# Patient Record
Sex: FEMALE | Race: ASIAN
[De-identification: names, ages, dates, MRNs, and addresses within clinical notes are randomized per-mention and may not be internally consistent; named-entity substitution may affect disease eponyms.]

---

## 2017-01-08 ENCOUNTER — HOSPITAL ENCOUNTER (EMERGENCY)
Dept: HOSPITAL 10 - FTE | Age: 62
Discharge: HOME | End: 2017-01-08
Payer: COMMERCIAL

## 2017-01-08 VITALS
BODY MASS INDEX: 19.77 KG/M2 | WEIGHT: 104.72 LBS | HEIGHT: 61 IN | BODY MASS INDEX: 19.77 KG/M2 | WEIGHT: 104.72 LBS | HEIGHT: 61 IN

## 2017-01-08 VITALS — TEMPERATURE: 99.1 F

## 2017-01-08 DIAGNOSIS — R05: Primary | ICD-10-CM

## 2017-01-08 DIAGNOSIS — R50.9: ICD-10-CM

## 2017-01-08 DIAGNOSIS — R09.81: ICD-10-CM

## 2017-01-08 DIAGNOSIS — J45.909: ICD-10-CM

## 2017-01-08 DIAGNOSIS — R19.7: ICD-10-CM

## 2017-01-08 DIAGNOSIS — R11.10: ICD-10-CM

## 2017-01-08 PROCEDURE — 99284 EMERGENCY DEPT VISIT MOD MDM: CPT

## 2017-01-08 NOTE — ERD
ER Documentation


Chief Complaint


Date/Time


DATE: 1/8/17 


TIME: 21:19


Chief Complaint


flu,sweatingx 2 days, reacting to Nyquil by diarrhea





HPI


61-year-old female presents here in the emergency department for cough runny 

nose, nasal congestion, diarrhea and vomiting for 2 days. Patient has been 

having the symptoms for the last 2 days. Patient has been having fever. Patient 

did not take any medications up and symptoms. Patient does not have any blood 

in the stool or black stool. Patient denies any blood in the vomit. Patient 

does not complain of abdominal pain. Patient does not have any flank pain. 

Patient does not have any chest pain or palpitations. Patient denies hematuria 

or dysuria.





ROS


All systems reviewed and are negative except as per history of present illness.





Medications


Home Meds


Active Scripts


Ondansetron (Ondansetron Odt) 4 Mg Tab.rapdis, 4 MG PO Q8 Y for NAUSEA AND/OR 

VOMITING, #30 TAB


   Prov:MINI MOBLEY NP         1/8/17


Dicyclomine Hcl* (Bentyl*) 20 Mg Tablet, 20 MG PO QID, #20 TAB


   Prov:MINI MOBLEY NP         1/8/17


Benzonatate* (Tessalon Perle*) 100 Mg Capsule, 100 MG PO Q8H Y for COUGH, #30 

CAP


   Prov:MINI MOBLEY NP         1/8/17


Guaifenesin-D-Methorphan Hb* (Guaifenesin* DM Syrup) 120 Ml Syrup, 10 ML PO Q4H 

Y for COUGH, #120 ML


   Prov:MINI MOBLEY NP         1/8/17


Acetaminophen* (Tylophen*) 500 Mg Capsule, 1 CAP PO Q6H Y for PAIN AND OR 

ELEVATED TEMP, #20 CAP


   Prov:MINI MOBLEY NP         1/8/17


Oseltamivir Phosphate* (Tamiflu*) 75 Mg Capsule, 75 MG PO BID for 5 Days, CAP


   Prov:MINI MOBLEY NP         1/8/17


Clotrimazole* (Clotrimazole-7*) Vaginal Cream..g., 1 APPLIC VAG HS for 7 Days, 

EA


   Prov:MINI MOBLEY NP         12/14/16


Phenazopyridine Hcl* (Pyridium*) 200 Mg Tab, 200 MG PO TID Y for URINARY PAIN, #

6 TAB


   Prov:MINI MOBLEY NP         12/14/16


Cephalexin* (Keflex*) 500 Mg Capsule, 500 MG PO QID for 5 Days, CAP


   Prov:JENNIFER MONTOYA MD         11/24/16


Phenazopyridine Hcl* (Pyridium*) 200 Mg Tab, 200 MG PO TID Y for URINARY PAIN, #

6 TAB


   Prov:JENNIFER MONTOYA MD         11/24/16


Hydrocodone Bit-Acetaminophen* (Norco*) 7.5-325 Tablet, 1 TAB PO Q4H Y for PAIN

, #20 TAB


   Prov:BAILEYCHRISTI DO         6/19/16


Ondansetron Hcl* (Zofran* ODT) 4 mg -ODT Tab.disper, 4 MG PO Q6 Y for NAUSEA AND

/OR VOMITING, #20 TAB


   Prov:BAILEYCHRISTI DO         6/19/16


Hydrocodone Bit-Acetaminophen* (Norco*) 7.5-325 Tablet, 1 TAB PO Q4H Y for PAIN

, #20 TAB


   Prov:MAGALY MELLO DO         6/4/16


Reported Medications


Albuterol Sulfate* (Proair HFA*) Unknown Strength Hfa.aer.ad, INH Q4H Y for 

WHEEZING AND SOB, #1 INHALER


   12/14/16


Omeprazole* (Omeprazole*) 20 Mg Capsule.dr, 20 MG PO DAILY, #30 CAP


   6/19/16





Allergies


Allergies:  


Coded Allergies:  


     ibuprofen (Unverified  Adverse Reaction, Unknown, TACHYCARDIA, SWEATING, 6/ 19/16)





PMhx/Soc


History of Surgery:  No


Anesthesia Reaction:  No


Hx Neurological Disorder:  No


Hx Respiratory Disorders:  Yes (ASTHMA)


Hx Cardiac Disorders:  No


Hx Psychiatric Problems:  No


Hx Miscellaneous Medical Probl:  Yes (seasonal allergy)


Hx Alcohol Use:  No


Hx Substance Use:  No


Hx Tobacco Use:  No





FmHx


Family History:  No coronary disease, No diabetes, No other





Physical Exam


Vitals





Vital Signs








  Date Time  Temp Pulse Resp B/P Pulse Ox O2 Delivery O2 Flow Rate FiO2


 


1/8/17 21:31 99.1       


 


1/8/17 20:15 101.4 102 20 140/89 94   








Physical Exam


GENERAL:  The patient is well developed and appropriate for usual state of 

health, in no apparent distress.


HEENT: Atraumatic. Ears: Normal tympanic membrane, no erythema or bulging. No 

ear canal swelling. No ear discharge. Nose: Erythematous nasal turbinates with 

clear nasal. Throat: oropharynx are erythematous with postnasal drip. No 

tonsillar swelling or tonsillar exudates. No lymphadenopathy.


CHEST:  Clear to auscultation bilaterally. There are no rales, wheezes or 

rhonchi. 


HEART:  Regular rate and rhythm. No murmurs, clicks, rubs or gallops. No S3 or 

S4.


ABDOMEN:  Soft, nontender and nondistended. Hyperactive bowel sounds. No 

rebound or guarding. No gross peritonitis. No gross organomegaly or masses. No 

Hagen sign or McBurney point tenderness.


BACK:  No midline or flank tenderness.


EXTREMITIES:  Equal pulses bilaterally. There is no peripheral clubbing, 

cyanosis or edema. No focal swelling or erythema. Full range of motion. Grossly 

neurovascularly intact.


NEURO:  Alert and oriented. Cranial nerves 2-12 intact. Motor strength in all 4 

extremities with 5/5 strength.  Sensation grossly intact. Normal speech and 

gait. 


SKIN:  There is no apparent rash or petechia. The skin is warm and dry.


HEMATOLOGIC AND LYMPHATIC:  There is no evidence of excessive bruising or 

lymphedema. No gross cervical, axillary, or inguinal lymphadenopathy.


Results 24 hrs





 Current Medications








 Medications


  (Trade)  Dose


 Ordered  Sig/Wil


 Route


 PRN Reason  Start Time


 Stop Time Status Last Admin


Dose Admin


 


 Acetaminophen


  (Tylenol Tab)  500 mg  ONCE  STAT


 PO


   1/8/17 20:58


 1/8/17 20:59 DC 1/8/17 21:08


 





Patient was given medicines for fever control here in the emergency department. 

After treatment, patient temperature improved and lower. Patient appears well 

and is hemodynamically stable.





Procedures/MDM


Medical Decision Making:  Patient symptoms are most likely consistent with to 

when the.  There is low suspicion for Pneumonia at this time since patients 

lungs sounds are clear, patient O2 saturation is normal and patient doesnt show 

any respiratory distress. Radiology exams are indicated at this time. There is 

low suspicion for other cardiopulmonary emergencies at this time such as CHF, 

Pulmonary Embolism, Pneumothorax, Aortic Aneurysm or any other cardiopulmonary 

emergencies at this time. There is low suspicion for sepsis. Patient appears 

well and is hemodynamically stable.  Fever is controlled with medicines. 





Disposition:  Home.  Condition: Stable


Prescriptions: Tamiflu, Zofran, ibuprofen, Zyrtec, guaifenesin DM


Instructions: Patient is advised to take medications as prescribed. Patient is 

advised to rest. Patient advised to increase fluid intake, do humidifier at 

home and if possible, do salt water gargles. Patient is advised that if 

symptoms are worse, shortness of breath, uncontrolled fever, stridor, vomiting, 

worst signs and symptoms to return to emergency department immediately. 

Otherwise, patient is advised to follow up with primary doctor in 5-7 days.





Departure


Diagnosis:  


 Primary Impression:  


 Influenza


Condition:  Stable


Patient Instructions:  Influenza (Adult)











MINI MOBLEY NP Jan 8, 2017 21:23

## 2018-11-03 ENCOUNTER — HOSPITAL ENCOUNTER (EMERGENCY)
Dept: HOSPITAL 91 - FTE | Age: 63
Discharge: HOME | End: 2018-11-03
Payer: COMMERCIAL

## 2018-11-03 ENCOUNTER — HOSPITAL ENCOUNTER (EMERGENCY)
Age: 63
Discharge: HOME | End: 2018-11-03

## 2018-11-03 DIAGNOSIS — J06.9: Primary | ICD-10-CM

## 2018-11-03 DIAGNOSIS — J45.901: ICD-10-CM

## 2018-11-03 PROCEDURE — 99283 EMERGENCY DEPT VISIT LOW MDM: CPT

## 2018-11-03 PROCEDURE — 94664 DEMO&/EVAL PT USE INHALER: CPT

## 2018-11-03 RX ADMIN — ALBUTEROL SULFATE 1 MG: 2.5 SOLUTION RESPIRATORY (INHALATION) at 14:00

## 2018-11-03 RX ADMIN — ACETAMINOPHEN 1 MG: 325 TABLET, FILM COATED ORAL at 14:05

## 2018-11-03 RX ADMIN — ONDANSETRON 1 MG: 4 TABLET, ORALLY DISINTEGRATING ORAL at 14:05

## 2018-11-07 ENCOUNTER — HOSPITAL ENCOUNTER (EMERGENCY)
Age: 63
Discharge: HOME | End: 2018-11-07

## 2018-11-07 ENCOUNTER — HOSPITAL ENCOUNTER (EMERGENCY)
Dept: HOSPITAL 91 - FTE | Age: 63
Discharge: HOME | End: 2018-11-07
Payer: COMMERCIAL

## 2018-11-07 DIAGNOSIS — T36.1X5A: ICD-10-CM

## 2018-11-07 DIAGNOSIS — J45.909: ICD-10-CM

## 2018-11-07 DIAGNOSIS — N39.0: Primary | ICD-10-CM

## 2018-11-07 DIAGNOSIS — N39.0: ICD-10-CM

## 2018-11-07 DIAGNOSIS — R22.0: Primary | ICD-10-CM

## 2018-11-07 LAB
ADD UMIC: YES
UR ASCORBIC ACID: NEGATIVE MG/DL
UR BACTERIA: (no result) /HPF
UR BILIRUBIN (DIP): NEGATIVE MG/DL
UR BLOOD (DIP): (no result) MG/DL
UR CLARITY: (no result)
UR COLOR: (no result)
UR GLUCOSE (DIP): NEGATIVE MG/DL
UR KETONES (DIP): NEGATIVE MG/DL
UR LEUKOCYTE ESTERASE (DIP): (no result) LEU/UL
UR NITRITE (DIP): NEGATIVE MG/DL
UR PH (DIP): 7 (ref 5–9)
UR RBC: 1 /HPF (ref 0–5)
UR SPECIFIC GRAVITY (DIP): 1 (ref 1–1.03)
UR TOTAL PROTEIN (DIP): NEGATIVE MG/DL
UR UROBILINOGEN (DIP): NEGATIVE MG/DL
UR WBC: 44 /HPF (ref 0–5)

## 2018-11-07 PROCEDURE — 99283 EMERGENCY DEPT VISIT LOW MDM: CPT

## 2018-11-07 PROCEDURE — 87086 URINE CULTURE/COLONY COUNT: CPT

## 2018-11-07 PROCEDURE — 81001 URINALYSIS AUTO W/SCOPE: CPT

## 2018-11-07 RX ADMIN — DIPHENHYDRAMINE HYDROCHLORIDE 1 MG: 50 CAPSULE ORAL at 16:44

## 2018-12-26 ENCOUNTER — HOSPITAL ENCOUNTER (EMERGENCY)
Dept: HOSPITAL 91 - E/R | Age: 63
Discharge: HOME | End: 2018-12-26
Payer: COMMERCIAL

## 2018-12-26 ENCOUNTER — HOSPITAL ENCOUNTER (EMERGENCY)
Age: 63
Discharge: HOME | End: 2018-12-26

## 2018-12-26 DIAGNOSIS — I10: ICD-10-CM

## 2018-12-26 DIAGNOSIS — R10.9: Primary | ICD-10-CM

## 2018-12-26 DIAGNOSIS — J45.909: ICD-10-CM

## 2018-12-26 LAB
ADD MAN DIFF?: NO
ADD UMIC: NO
ALANINE AMINOTRANSFERASE: 16 IU/L (ref 13–69)
ALBUMIN/GLOBULIN RATIO: 1.23
ALBUMIN: 4.8 G/DL (ref 3.3–4.9)
ALKALINE PHOSPHATASE: 108 IU/L (ref 42–121)
ANION GAP: 11 (ref 5–13)
ASPARTATE AMINO TRANSFERASE: 44 IU/L (ref 15–46)
BASOPHIL #: 0 10^3/UL (ref 0–0.1)
BASOPHILS %: 0.3 % (ref 0–2)
BILIRUBIN,DIRECT: 0 MG/DL (ref 0–0.2)
BILIRUBIN,TOTAL: 0.7 MG/DL (ref 0.2–1.3)
BLOOD UREA NITROGEN: 6 MG/DL (ref 7–20)
CALCIUM: 9.7 MG/DL (ref 8.4–10.2)
CARBON DIOXIDE: 28 MMOL/L (ref 21–31)
CHLORIDE: 97 MMOL/L (ref 97–110)
CREATININE: 0.55 MG/DL (ref 0.44–1)
EOSINOPHILS #: 0.1 10^3/UL (ref 0–0.5)
EOSINOPHILS %: 2 % (ref 0–7)
GLOBULIN: 3.9 G/DL (ref 1.3–3.2)
GLUCOSE: 110 MG/DL (ref 70–220)
HEMATOCRIT: 46.2 % (ref 37–47)
HEMOGLOBIN: 14.7 G/DL (ref 12–16)
IMMATURE GRANS #M: 0.02 10^3/UL (ref 0–0.03)
IMMATURE GRANS % (M): 0.3 % (ref 0–0.43)
LIPASE: 125 U/L (ref 23–300)
LYMPHOCYTES #: 0.9 10^3/UL (ref 0.8–2.9)
LYMPHOCYTES %: 15.3 % (ref 15–51)
MEAN CORPUSCULAR HEMOGLOBIN: 25.7 PG (ref 29–33)
MEAN CORPUSCULAR HGB CONC: 31.8 G/DL (ref 32–37)
MEAN CORPUSCULAR VOLUME: 80.9 FL (ref 82–101)
MEAN PLATELET VOLUME: 9.1 FL (ref 7.4–10.4)
MONOCYTE #: 0.2 10^3/UL (ref 0.3–0.9)
MONOCYTES %: 3.4 % (ref 0–11)
NEUTROPHIL #: 4.6 10^3/UL (ref 1.6–7.5)
NEUTROPHILS %: 78.7 % (ref 39–77)
NUCLEATED RED BLOOD CELLS #: 0 10^3/UL (ref 0–0)
NUCLEATED RED BLOOD CELLS%: 0 /100WBC (ref 0–0)
PLATELET COUNT: 323 10^3/UL (ref 140–415)
POTASSIUM: 4.3 MMOL/L (ref 3.5–5.1)
RED BLOOD COUNT: 5.71 10^6/UL (ref 4.2–5.4)
RED CELL DISTRIBUTION WIDTH: 14.6 % (ref 11.5–14.5)
SODIUM: 136 MMOL/L (ref 135–144)
TOTAL PROTEIN: 8.7 G/DL (ref 6.1–8.1)
UR ASCORBIC ACID: NEGATIVE MG/DL
UR BILIRUBIN (DIP): NEGATIVE MG/DL
UR BLOOD (DIP): NEGATIVE MG/DL
UR CLARITY: CLEAR
UR COLOR: YELLOW
UR GLUCOSE (DIP): NEGATIVE MG/DL
UR KETONES (DIP): (no result) MG/DL
UR LEUKOCYTE ESTERASE (DIP): NEGATIVE LEU/UL
UR NITRITE (DIP): NEGATIVE MG/DL
UR PH (DIP): 6 (ref 5–9)
UR SPECIFIC GRAVITY (DIP): 1.01 (ref 1–1.03)
UR TOTAL PROTEIN (DIP): NEGATIVE MG/DL
UR UROBILINOGEN (DIP): NEGATIVE MG/DL
WHITE BLOOD COUNT: 5.9 10^3/UL (ref 4.8–10.8)

## 2018-12-26 PROCEDURE — 36415 COLL VENOUS BLD VENIPUNCTURE: CPT

## 2018-12-26 PROCEDURE — 96375 TX/PRO/DX INJ NEW DRUG ADDON: CPT

## 2018-12-26 PROCEDURE — 81003 URINALYSIS AUTO W/O SCOPE: CPT

## 2018-12-26 PROCEDURE — 96374 THER/PROPH/DIAG INJ IV PUSH: CPT

## 2018-12-26 PROCEDURE — 83690 ASSAY OF LIPASE: CPT

## 2018-12-26 PROCEDURE — 99285 EMERGENCY DEPT VISIT HI MDM: CPT

## 2018-12-26 PROCEDURE — 85025 COMPLETE CBC W/AUTO DIFF WBC: CPT

## 2018-12-26 PROCEDURE — 74176 CT ABD & PELVIS W/O CONTRAST: CPT

## 2018-12-26 PROCEDURE — 80053 COMPREHEN METABOLIC PANEL: CPT

## 2018-12-26 PROCEDURE — 71045 X-RAY EXAM CHEST 1 VIEW: CPT

## 2018-12-26 RX ADMIN — ONDANSETRON HYDROCHLORIDE 1 MG: 2 INJECTION, SOLUTION INTRAMUSCULAR; INTRAVENOUS at 20:54

## 2018-12-26 RX ADMIN — THIAMINE HYDROCHLORIDE 1 MLS/HR: 100 INJECTION, SOLUTION INTRAMUSCULAR; INTRAVENOUS at 20:54

## 2018-12-26 RX ADMIN — MORPHINE SULFATE 1 MG: 2 INJECTION, SOLUTION INTRAMUSCULAR; INTRAVENOUS at 20:55

## 2019-01-14 ENCOUNTER — HOSPITAL ENCOUNTER (EMERGENCY)
Dept: HOSPITAL 10 - FTE | Age: 64
LOS: 1 days | Discharge: HOME | End: 2019-01-15
Payer: COMMERCIAL

## 2019-01-14 ENCOUNTER — HOSPITAL ENCOUNTER (EMERGENCY)
Dept: HOSPITAL 91 - FTE | Age: 64
LOS: 1 days | Discharge: HOME | End: 2019-01-15
Payer: COMMERCIAL

## 2019-01-14 VITALS
BODY MASS INDEX: 19.87 KG/M2 | WEIGHT: 101.19 LBS | HEIGHT: 60 IN | BODY MASS INDEX: 19.87 KG/M2 | HEIGHT: 60 IN | WEIGHT: 101.19 LBS

## 2019-01-14 DIAGNOSIS — I10: ICD-10-CM

## 2019-01-14 DIAGNOSIS — R10.13: Primary | ICD-10-CM

## 2019-01-14 DIAGNOSIS — E03.9: ICD-10-CM

## 2019-01-14 DIAGNOSIS — J45.909: ICD-10-CM

## 2019-01-14 DIAGNOSIS — R11.2: ICD-10-CM

## 2019-01-14 PROCEDURE — 99283 EMERGENCY DEPT VISIT LOW MDM: CPT

## 2019-01-15 VITALS — HEART RATE: 80 BPM | DIASTOLIC BLOOD PRESSURE: 93 MMHG | SYSTOLIC BLOOD PRESSURE: 142 MMHG | RESPIRATION RATE: 18 BRPM

## 2019-01-15 RX ADMIN — ALUMINUM HYDROXIDE, MAGNESIUM HYDROXIDE, DIMETHICONE 1 ML: 200; 200; 20 SUSPENSION ORAL at 02:53

## 2019-01-15 RX ADMIN — ONDANSETRON 1 MG: 4 TABLET, ORALLY DISINTEGRATING ORAL at 02:53

## 2019-01-15 NOTE — ERD
ER Documentation


Chief Complaint


Chief Complaint





ABD PAIN WITH N/V X1DAY





HPI


63-year-old female has a history of hypertension, hypothyroidism presents for 


abdominal pain, nausea, vomiting times 1 day per patient developed the symptoms 


after eating. Pain is noted to be in the upper part of the abdomen.  Pain is 


rated 7 out of 10.  Described as sharp.  Denies any fevers or diarrhea.





ROS


All systems reviewed and are negative except as per history of present illness.





Medications


Home Meds


Active Scripts


Ondansetron (Ondansetron Odt) 4 Mg Tab.rapdis, 4 MG PO Q6H PRN for NAUSEA AND/OR


VOMITING, #15 TAB


   Prov:EDMOND LUIS DO         1/15/19


Magaldrate/Simethicone* (Mag-Al Plus Suspension*) 30 Ml Oral.susp, 30 ML PO Q6H 


PRN for GASTROINTESTINAL UPSET, #1 BOTTLE


   Prov:EDMOND LUIS DO         1/15/19


Ondansetron (Ondansetron Odt) 4 Mg Tab.rapdis, 4 MG PO Q6H PRN for NAUSEA AND/OR


VOMITING, #10 TAB


   Prov:ADARSH VILLA         12/26/18


Dicyclomine HCl (Dicyclomine HCl) 10 Mg Capsule, 10 MG PO TID PRN for ABDOMINAL 


CRAMPING, #20 CAP


   Prov:ADARSH VILLA         12/26/18


Reported Medications


Lisinopril* (Lisinopril*) 5 Mg Tablet, 5 MG PO DAILY, #30 TAB


   12/26/18


Levothyroxine Sodium* (Levothyroxine Sodium*) 50 Mcg Tablet, 50 MCG PO BEFORE 


BREAKFAST, #30 TAB


   12/26/18


Cetirizine Hcl* (Cetirizine Hcl*) 10 Mg Tablet, 10 MG PO DAILY, #30 TAB


   12/26/18


Albuterol Sulfate* (Ventolin HFA*) 18 Gm Hfa.aer.ad, 2 PUFF INHALATION Q4H, #1 


INHALER


   12/26/18


Albuterol Sulfate* (Albuterol Sulfate* Neb) 0.083%-3 Ml Neb, 2.5 MG NEB Q4H PRN 


for AS NEEDED, #30 VIAL


   12/26/18





Allergies


Allergies:  


Coded Allergies:  


     diphenhydramine (Verified  Allergy, Intermediate, PALPITATIONS, 12/26/18)


     cephalexin (Verified  Allergy, Mild, tongue swelling, 12/26/18)


     ibuprofen (Unverified  Adverse Reaction, Unknown, TACHYCARDIA, SWEATING, 


12/26/18)





PMhx/Soc


History of Surgery:  No


Anesthesia Reaction:  No


Hx Neurological Disorder:  No


Hx Respiratory Disorders:  Yes (ASTHMA)


Hx Cardiac Disorders:  Yes (HTN)


Hx Psychiatric Problems:  No


Hx Miscellaneous Medical Probl:  Yes (seasonal allergy)


Hx Alcohol Use:  No


Hx Substance Use:  No


Hx Tobacco Use:  No


Smoking Status:  Never smoker





Physical Exam


Vitals





Vital Signs


  Date      Temp  Pulse  Resp  B/P (MAP)   Pulse Ox  O2          O2 Flow    FiO2


Time                                                 Delivery    Rate


   1/15/19  98.6     80    18      142/93        98  Room Air


     04:07                          (109)


   1/15/19  98.4     90    19      150/85        98


     00:01                          (106)





Physical Exam


Const:   No acute distress


Resp:   Clear to auscultation bilaterally


Cardio:   Regular rate and rhythm, no murmurs


Abd:    Soft, non distended. Normal bowel sounds, mild epigastric abdomen ten


derness to palpation, no McBurney's point tenderness, no Hagen sign, no rebound


or guarding noted


Skin:   No petechiae or rashes


Back:   No midline or flank tenderness


Ext:    No cyanosis, or edema


Neur:   Awake and alert


Psych:    Normal Mood and Affect


Results 24 hrs





Current Medications


 Medications
   Dose
          Sig/Wil
       Start Time
   Status  Last


 (Trade)       Ordered        Route
 PRN     Stop Time              Admin
Dose


                              Reason                                Admin


 Ondansetron    4 mg           ONCE  STAT
    1/15/19       DC           1/15/19


HCl
  (Zofran                 ODT
           02:44
                       02:53



Odt)                                         1/15/19 02:46


                40 ml          ONCE  ONCE
    1/15/19       DC           1/15/19


Miscellaneous                 PO
            03:00
                       02:53




 Medication
                                1/15/19 03:01


 (Gi Cocktail


(2))








Procedures/MDM


Medical Decision Making:





Differential diagnosis includes but not limited to acute gastritis, acute 


gastroenteritis, appendicitis, cholecystitis, pancreatitis.





Patient appeared well on physical exam. Nontoxic appearing.


Abdominal examination was relatively benign.  There is low suspicion for acute 


abdomen.





Patient given Zofran and a GI cocktail in the ER with relief of symptoms.





Prescription(s):


Patient given prescription for Zofran and Mylanta.





Patient likely has an acute gastritis.





Patient advised to follow up with PCP in 1-2 days. Patient advised to return to 


ED for new or worsening symptoms. Patient stable on discharge from the ED.








Disclaimer: Inadvertent spelling and grammatical errors are likely due to 


EHR/dictation software use and do not reflect on the overall quality of patient 


care. Also, please note that the electronic time recorded on this note does not 


necessarily reflect the actual time of the patient encounter.





Departure


Diagnosis:  


   Primary Impression:  


   Abdominal pain


   Abdominal location:  epigastric  Qualified Codes:  R10.13 - Epigastric pain


Condition:  Fair


Patient Instructions:  Abdominal Pain


Referrals:  


[facility]


YOU HAVE RECEIVED A MEDICAL SCREENING EXAM AND THE RESULTS INDICATE THAT YOU DO 


NOT HAVE A CONDITION THAT REQUIRES URGENT TREATMENT IN THE EMERGENCY DEPARTMENT.





FURTHER EVALUATION AND TREATMENT OF YOUR CONDITION CAN WAIT UNTIL YOU ARE SEEN 


IN YOUR DOCTORS OFFICE WITHIN THE NEXT 1-2 DAYS. IT IS YOUR RESPONSIBILITY TO 


MAKE AN APPOINTMENT FOR FOLOW-UP CARE.





IF YOU HAVE A PRIMARY DOCTOR


--you should call your primary doctor and schedule an appointment





IF YOU DO NOT HAVE A PRIMARY DOCTOR YOU CAN CALL OUR PHYSICIAN REFERRAL HOTLINE 


AT


 [phone] 





IF YOU CAN NOT AFFORD TO SEE A PHYSICIAN YOU CAN CHOSE FROM THE FOLLOWING 


[facility] [phone] [address] [facility]. [facility] [phone] [address] [facility]. [facility] [phone]


[address] [facility] [phone] [address] [facility]. [facility] [phone] [address] [facility]. [facility]. [phone]


[address]





Additional Instructions:  


Call your primary care doctor TOMORROW for an appointment during the next 1-2 


days.See the doctor sooner or return here if your condition worsens before your 


appointment time.











EDMOND LUIS DO                 Greg 15, 2019 07:08